# Patient Record
Sex: FEMALE | Race: WHITE
[De-identification: names, ages, dates, MRNs, and addresses within clinical notes are randomized per-mention and may not be internally consistent; named-entity substitution may affect disease eponyms.]

---

## 2021-10-09 ENCOUNTER — HOSPITAL ENCOUNTER (EMERGENCY)
Dept: HOSPITAL 96 - M.ERS | Age: 41
Discharge: HOME | End: 2021-10-09
Payer: COMMERCIAL

## 2021-10-09 VITALS — BODY MASS INDEX: 49.61 KG/M2 | WEIGHT: 280.01 LBS | HEIGHT: 63 IN

## 2021-10-09 VITALS — SYSTOLIC BLOOD PRESSURE: 154 MMHG | DIASTOLIC BLOOD PRESSURE: 94 MMHG

## 2021-10-09 DIAGNOSIS — Z20.822: ICD-10-CM

## 2021-10-09 DIAGNOSIS — G43.909: ICD-10-CM
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
## 2021-10-09 DIAGNOSIS — E03.9: ICD-10-CM

## 2021-10-09 DIAGNOSIS — J06.9: Primary | ICD-10-CM

## 2021-10-09 DIAGNOSIS — Z88.5: ICD-10-CM

## 2021-10-09 DIAGNOSIS — L08.9: ICD-10-CM

## 2021-10-09 DIAGNOSIS — Z88.1: ICD-10-CM

## 2021-10-09 DIAGNOSIS — S80.861A: ICD-10-CM

## 2021-10-09 DIAGNOSIS — Z86.73: ICD-10-CM
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
## 2021-10-09 DIAGNOSIS — G89.29: ICD-10-CM
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
## 2021-10-09 DIAGNOSIS — M79.7: ICD-10-CM

## 2021-10-09 DIAGNOSIS — I10: ICD-10-CM

## 2021-10-09 LAB
ABSOLUTE BASOPHILS: 0 THOU/UL (ref 0–0.2)
ABSOLUTE EOSINOPHILS: 0.2 THOU/UL (ref 0–0.7)
ABSOLUTE MONOCYTES: 1 THOU/UL (ref 0–1.2)
ALBUMIN SERPL-MCNC: 4.2 G/DL (ref 3.4–5)
ALP SERPL-CCNC: 151 U/L (ref 46–116)
ALT SERPL-CCNC: 38 U/L (ref 30–65)
ANION GAP SERPL CALC-SCNC: 11 MMOL/L (ref 7–16)
AST SERPL-CCNC: 25 U/L (ref 15–37)
BASOPHILS NFR BLD AUTO: 0.4 %
BILIRUB SERPL-MCNC: 0.4 MG/DL
BUN SERPL-MCNC: 23 MG/DL (ref 7–18)
CALCIUM SERPL-MCNC: 9.3 MG/DL (ref 8.5–10.1)
CHLORIDE SERPL-SCNC: 97 MMOL/L (ref 98–107)
CO2 SERPL-SCNC: 29 MMOL/L (ref 21–32)
CREAT SERPL-MCNC: 1.4 MG/DL (ref 0.6–1.3)
EOSINOPHIL NFR BLD: 1.6 %
GLUCOSE SERPL-MCNC: 92 MG/DL (ref 70–99)
GRANULOCYTES NFR BLD MANUAL: 71.3 %
HCT VFR BLD CALC: 39.9 % (ref 37–47)
HGB BLD-MCNC: 13.7 GM/DL (ref 12–15)
LYMPHOCYTES # BLD: 2.3 THOU/UL (ref 0.8–5.3)
LYMPHOCYTES NFR BLD AUTO: 18.5 %
MCH RBC QN AUTO: 30.1 PG (ref 26–34)
MCHC RBC AUTO-ENTMCNC: 34.2 G/DL (ref 28–37)
MCV RBC: 87.9 FL (ref 80–100)
MONOCYTES NFR BLD: 8.2 %
MPV: 7.9 FL. (ref 7.2–11.1)
NEUTROPHILS # BLD: 8.8 THOU/UL (ref 1.6–8.1)
NT-PRO BRAIN NAT PEPTIDE: 40 PG/ML (ref ?–300)
NUCLEATED RBCS: 0 /100WBC
PLATELET COUNT*: 312 THOU/UL (ref 150–400)
POTASSIUM SERPL-SCNC: 3.7 MMOL/L (ref 3.5–5.1)
PROT SERPL-MCNC: 8.2 G/DL (ref 6.4–8.2)
RBC # BLD AUTO: 4.54 MIL/UL (ref 4.2–5)
RDW-CV: 13.2 % (ref 10.5–14.5)
SODIUM SERPL-SCNC: 137 MMOL/L (ref 136–145)
WBC # BLD AUTO: 12.4 THOU/UL (ref 4–11)

## 2021-10-10 NOTE — EKG
[place] [address]
Phone:  [phone]                     ELECTROCARDIOGRAM REPORT      
_______________________________________________________________________________
 
Name:         SHU TSAI            Room:                     [facility]#:    [id number]     Account #:     [id number]  
Admission:    10/09/21    Attend Phys:                     
Discharge:    10/09/21    Date of Birth: [birth year]  
Date of Service: 10/09/21 2056  Report #:      [id number]
        [id number]
_______________________________________________________________________________
THIS REPORT FOR:  //name//                      
 
                         [facility] ED
                                       
Test Date:    2021-10-09               Test Time:    20:56:09
Pat Name:     SHU TSAI              Department:   
Patient ID:   [id number]            Room:          
Gender:       F                        Technician:   BXIONG
:          1980               Requested By: Naomy Cha
Order Number: [id number] MD:   Andre Gloria
                                 Measurements
Intervals                              Axis          
Rate:         87                       P:            49
AR:           157                      QRS:          25
QRSD:         106                      T:            16
QT:           390                                    
QTc:          470                                    
                           Interpretive Statements
Sinus rhythm
Compared to ECG 2021 19:53:33
Sinus tachycardia no longer present
T-wave abnormality no longer present
Electronically Signed On 10- 10:25:48 CDT by Andre Gloria
[email or web address]
 
 
 
 
 
 
 
 
 
 
 
 
 
 
 
 
 
 
 
 
  <ELECTRONICALLY SIGNED>
                                           By: Andre Gloria MD, FACC   
  10/10/21     1025
D: 10/09/21 2056   _____________________________________
T: 10/09/21 2056   Andre Gloria MD, FAC     /EPI

## 2022-01-22 ENCOUNTER — HOSPITAL ENCOUNTER (EMERGENCY)
Dept: HOSPITAL 96 - M.ERS | Age: 42
Discharge: HOME | End: 2022-01-22
Payer: COMMERCIAL

## 2022-01-22 VITALS — WEIGHT: 276 LBS | HEIGHT: 63 IN | BODY MASS INDEX: 48.9 KG/M2

## 2022-01-22 VITALS — SYSTOLIC BLOOD PRESSURE: 152 MMHG | DIASTOLIC BLOOD PRESSURE: 87 MMHG

## 2022-01-22 DIAGNOSIS — M79.7: ICD-10-CM

## 2022-01-22 DIAGNOSIS — Z79.899: ICD-10-CM

## 2022-01-22 DIAGNOSIS — G43.909: ICD-10-CM

## 2022-01-22 DIAGNOSIS — I10: ICD-10-CM

## 2022-01-22 DIAGNOSIS — U07.1: Primary | ICD-10-CM

## 2022-01-22 DIAGNOSIS — Z88.8: ICD-10-CM

## 2022-01-22 DIAGNOSIS — E11.40: ICD-10-CM

## 2022-01-22 DIAGNOSIS — F41.9: ICD-10-CM

## 2022-01-22 DIAGNOSIS — M06.9: ICD-10-CM

## 2022-01-22 DIAGNOSIS — Z88.5: ICD-10-CM

## 2022-01-22 DIAGNOSIS — Z79.2: ICD-10-CM

## 2022-01-22 LAB
ABSOLUTE MONOCYTES: 1.3 THOU/UL (ref 0–1.2)
ALBUMIN SERPL-MCNC: 4 G/DL (ref 3.4–5)
ALP SERPL-CCNC: 156 U/L (ref 46–116)
ALT SERPL-CCNC: 37 U/L (ref 30–65)
ANION GAP SERPL CALC-SCNC: 12 MMOL/L (ref 7–16)
AST SERPL-CCNC: 28 U/L (ref 15–37)
BILIRUB SERPL-MCNC: 0.4 MG/DL
BUN SERPL-MCNC: 26 MG/DL (ref 7–18)
CALCIUM SERPL-MCNC: 8.9 MG/DL (ref 8.5–10.1)
CHLORIDE SERPL-SCNC: 98 MMOL/L (ref 98–107)
CO2 SERPL-SCNC: 30 MMOL/L (ref 21–32)
CREAT SERPL-MCNC: 1.4 MG/DL (ref 0.6–1.3)
GLUCOSE SERPL-MCNC: 94 MG/DL (ref 70–99)
GRANULOCYTES NFR BLD MANUAL: 71 %
HCT VFR BLD CALC: 43.7 % (ref 37–47)
HGB BLD-MCNC: 14.8 GM/DL (ref 12–15)
LYMPHOCYTES # BLD: 1.7 THOU/UL (ref 0.8–5.3)
LYMPHOCYTES NFR BLD AUTO: 15 %
MAGNESIUM SERPL-MCNC: 2.2 MG/DL (ref 1.8–2.4)
MCH RBC QN AUTO: 29.6 PG (ref 26–34)
MCHC RBC AUTO-ENTMCNC: 33.8 G/DL (ref 28–37)
MCV RBC: 87.5 FL (ref 80–100)
MONOCYTES NFR BLD: 12 %
MPV: 8.2 FL. (ref 7.2–11.1)
NEUTROPHILS # BLD: 8.1 THOU/UL (ref 1.6–8.1)
NEUTS BAND NFR BLD: 2 %
NUCLEATED RBCS: 0 /100WBC
PLATELET # BLD EST: ADEQUATE 10*3/UL
PLATELET COUNT*: 309 THOU/UL (ref 150–400)
POTASSIUM SERPL-SCNC: 4.6 MMOL/L (ref 3.5–5.1)
PROT SERPL-MCNC: 8.4 G/DL (ref 6.4–8.2)
RBC # BLD AUTO: 4.99 MIL/UL (ref 4.2–5)
RBC MORPH BLD: NORMAL
RDW-CV: 12.8 % (ref 10.5–14.5)
SODIUM SERPL-SCNC: 140 MMOL/L (ref 136–145)
WBC # BLD AUTO: 11.1 THOU/UL (ref 4–11)

## 2022-01-23 NOTE — EKG
[place] [address]
Phone:  [phone]                     ELECTROCARDIOGRAM REPORT      
_______________________________________________________________________________
 
Name:         SHU TSAI            Room:                     [facility]#:    [id number]     Account #:     [id number]  
Admission:    22    Attend Phys:                     
Discharge:    22    Date of Birth: [birth year]  
Date of Service: 22  Report #:      [id number]
        [id number]
_______________________________________________________________________________
THIS REPORT FOR:  //name//                      
 
                         [facility] ED
                                       
Test Date:    2022               Test Time:    18:20:36
Pat Name:     SHU TSAI              Department:   
Patient ID:   [id number]            Room:          
Gender:       F                        Technician:   
:          1980               Requested By: Marya Goldberg
Order Number: [id number] MD:   Saroj Spencer
                                 Measurements
Intervals                              Axis          
Rate:         77                       P:            39
NC:           153                      QRS:          40
QRSD:         141                      T:            50
QT:           408                                    
QTc:          462                                    
                           Interpretive Statements
Sinus rhythm
nonspecific st segment changes
Nonspecific intraventricular conduction delay
Compared to ECG 10/09/2021 20:56:09
no change
Electronically Signed On 2022 11:17:46 CST by Saroj Spencer
[email or web address]
 
 
 
 
 
 
 
 
 
 
 
 
 
 
 
 
 
 
 
  <ELECTRONICALLY SIGNED>
                                           By: Saroj Spencer MD, [facility]      
  22     1117
D: 22 1820   _____________________________________
T: 22 1820   Saroj Spencer MD, [facility]        /EPI